# Patient Record
Sex: MALE | URBAN - METROPOLITAN AREA
[De-identification: names, ages, dates, MRNs, and addresses within clinical notes are randomized per-mention and may not be internally consistent; named-entity substitution may affect disease eponyms.]

---

## 2023-06-14 ENCOUNTER — OFFICE VISIT (OUTPATIENT)
Dept: URBAN - METROPOLITAN AREA CLINIC 71 | Facility: CLINIC | Age: 78
End: 2023-06-14
Payer: MEDICARE

## 2023-06-14 DIAGNOSIS — H35.3131 NONEXUDATIVE AGE-RELATED MACULAR DEGENERATION, BILATERAL, EARLY DRY STAGE: ICD-10-CM

## 2023-06-14 DIAGNOSIS — H43.812 VITREOUS DEGENERATION, LEFT EYE: ICD-10-CM

## 2023-06-14 DIAGNOSIS — H25.13 NUCLEAR SCLEROSIS CATARACT, BILATERAL: ICD-10-CM

## 2023-06-14 DIAGNOSIS — E11.9 TYPE 2 DIABETES MELLITUS W/O COMPLICATION: Primary | ICD-10-CM

## 2023-06-14 PROCEDURE — 92134 CPTRZ OPH DX IMG PST SGM RTA: CPT | Performed by: OPTOMETRIST

## 2023-06-14 PROCEDURE — 99204 OFFICE O/P NEW MOD 45 MIN: CPT | Performed by: OPTOMETRIST

## 2023-06-14 ASSESSMENT — INTRAOCULAR PRESSURE
OS: 12
OD: 10

## 2023-06-14 ASSESSMENT — KERATOMETRY
OD: 43.13
OS: 43.50

## 2023-06-14 NOTE — IMPRESSION/PLAN
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 Plan: Continue to monitor. Recommend yearly dilated exams.

## 2023-06-14 NOTE — IMPRESSION/PLAN
Impression: Nonexudative age-related macular degeneration, bilateral, early dry stage: H35.3131. OCT mac 06/14/23: No SRF/IRF OU. Plan: OCT mac ordered and performed today. Discussed condition in detail. AMD and AREDS education given. Recommend Vista Advanced Dry Eye Formula Supplements. Request sent to [facility]. [place]ler grid given and explained how and when to use. Call if symptoms worsen. Will continue to monitor.

## 2023-06-14 NOTE — IMPRESSION/PLAN
Impression: Type 2 diabetes mellitus w/o complication: W03.6. Plan: Discussed. No diabetic retinopathy. Discussed ocular and systemic benefits of blood sugar control. Patient was instructed to monitor vision for changes and to call if noted.